# Patient Record
Sex: FEMALE | Race: WHITE | NOT HISPANIC OR LATINO
[De-identification: names, ages, dates, MRNs, and addresses within clinical notes are randomized per-mention and may not be internally consistent; named-entity substitution may affect disease eponyms.]

---
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
## 2019-06-18 ENCOUNTER — APPOINTMENT (OUTPATIENT)
Dept: PEDIATRICS | Facility: CLINIC | Age: 19
End: 2019-06-18
Payer: COMMERCIAL

## 2019-06-18 VITALS
WEIGHT: 248 LBS | HEIGHT: 64 IN | DIASTOLIC BLOOD PRESSURE: 70 MMHG | HEART RATE: 84 BPM | SYSTOLIC BLOOD PRESSURE: 126 MMHG | BODY MASS INDEX: 42.34 KG/M2 | OXYGEN SATURATION: 99 %

## 2019-06-18 DIAGNOSIS — Z87.09 PERSONAL HISTORY OF OTHER DISEASES OF THE RESPIRATORY SYSTEM: ICD-10-CM

## 2019-06-18 DIAGNOSIS — Z00.00 ENCOUNTER FOR GENERAL ADULT MEDICAL EXAMINATION W/OUT ABNORMAL FINDINGS: ICD-10-CM

## 2019-06-18 DIAGNOSIS — N92.6 IRREGULAR MENSTRUATION, UNSPECIFIED: ICD-10-CM

## 2019-06-18 PROCEDURE — 99395 PREV VISIT EST AGE 18-39: CPT

## 2019-06-18 PROCEDURE — 96160 PT-FOCUSED HLTH RISK ASSMT: CPT | Mod: 59

## 2019-06-18 PROCEDURE — 96127 BRIEF EMOTIONAL/BEHAV ASSMT: CPT

## 2019-06-18 PROCEDURE — 92551 PURE TONE HEARING TEST AIR: CPT

## 2019-06-18 NOTE — PHYSICAL EXAM
[Alert] : alert [No Acute Distress] : no acute distress [Normocephalic] : normocephalic [EOMI Bilateral] : EOMI bilateral [Clear tympanic membranes with bony landmarks and light reflex present bilaterally] : clear tympanic membranes with bony landmarks and light reflex present bilaterally  [Pink Nasal Mucosa] : pink nasal mucosa [Nonerythematous Oropharynx] : nonerythematous oropharynx [Supple, full passive range of motion] : supple, full passive range of motion [No Palpable Masses] : no palpable masses [Clear to Ausculatation Bilaterally] : clear to auscultation bilaterally [Regular Rate and Rhythm] : regular rate and rhythm [Normal S1, S2 audible] : normal S1, S2 audible [No Murmurs] : no murmurs [NonTender] : non tender [Soft] : soft [+2 Femoral Pulses] : +2 femoral pulses [Non Distended] : non distended [Normoactive Bowel Sounds] : normoactive bowel sounds [No Hepatomegaly] : no hepatomegaly [No Splenomegaly] : no splenomegaly [No Abnormal Lymph Nodes Palpated] : no abnormal lymph nodes palpated [Normal Muscle Tone] : normal muscle tone [No Gait Asymmetry] : no gait asymmetry [No pain or deformities with palpation of bone, muscles, joints] : no pain or deformities with palpation of bone, muscles, joints [Straight] : straight [+2 Patella DTR] : +2 patella DTR [Cranial Nerves Grossly Intact] : cranial nerves grossly intact [No Rash or Lesions] : no rash or lesions

## 2019-06-19 NOTE — COUNSELING
[FreeTextEntry1] : Counseling: Alcohol and drugs:. Bike helmet:. Breast self exam after periods:. Communication to patient: Counseling for nutrition provided Yes, Counseling for physical activity provided Yes, Counseled the Patient on tobacco use; cessation provided. Diet:. Exercise:. Injury prevention:. Seatbelts:. Smoking:. Sunscreen\par Health Promotion: Oral health brush teeth - small amount of flouride toothpaste, dental appointment. Sexual health sexual development, change in body structure , safe sex , sexually transmitted disease education , use of condoms. Smoke exposure. Personal hygiene. Skin/UV protection. \par Injury Prevention/Safety: Bike safety. Car restraints and seat belts. Drugs/alcohol/tobacco. \par Nutrition Counseling: Healthy food choices no forced foods , family meals as often as possible, limit or eliminate junk food, adequate calcium , adequate iron containing foods. Weight management. \par \par Social/Behavioral Counseling: Praise good behavior. Relationships caution about peer pressure , advised to cultivate safe relationships , how to say no . TV/game time limit and control TV and game time . Violence Prevention: Gun Safety. Gang Membership and Violence. \par \par Vaccine information sheet given- Date on VIS verified. \par

## 2019-06-19 NOTE — DISCUSSION/SUMMARY
[FreeTextEntry1] : 18 YEARS OLD GIRL HERE FOR CHECK UP.WANTS IT FOR COSMATOLOGY LICENSE\par DISCUSSED WITH HER  ABOUT WEIGHT,EXERCISE\par NEEDS TO SEE GYN FOR IRREGULAR PERIODS AS IT CAN BE DUE TO PCOS AND THAT CAN CAUSE INSULIN RESISTANCE

## 2019-06-19 NOTE — HISTORY OF PRESENT ILLNESS
[Mother] : mother [Up to date] : Up to date [Irregular menses] : irregular menses [Is permitted and is able to make independent decisions] : Is permitted and is able to make independent decisions [Has family members/adults to turn to for help] : has family members/adults to turn to for help [Drinks non-sweetened liquids] : drinks non-sweetened liquids  [Has friends] : has friends [Uses safety belts/safety equipment] : uses safety belts/safety equipment  [Has peer relationships free of violence] : has peer relationships free of violence [No] : Patient has not had sexual intercourse. [HIV Screening Declined] : HIV Screening Declined [Has ways to cope with stress] : has ways to cope with stress [Displays self-confidence] : displays self-confidence [With Teen] : teen [Sleep Concerns] : no sleep concerns [At least 1 hour of physical activity a day] : does not do at least 1 hour of physical activity a day [Uses electronic nicotine delivery system] : does not use electronic nicotine delivery system [Exposure to electronic nicotine delivery system] : no exposure to electronic nicotine delivery system [Uses tobacco] : does not use tobacco [Exposure to tobacco] : no exposure to tobacco [Uses drugs] : does not use drugs  [Exposure to drugs] : no exposure to drugs [Drinks alcohol] : does not drink alcohol [Exposure to alcohol] : no exposure to alcohol [Impaired/distracted driving] : no impaired/distracted driving [Has problems with sleep] : does not have problems with sleep [Gets depressed, anxious, or irritable/has mood swings] : does not get depressed, anxious, or irritable/has mood swings [Has thought about hurting self or considered suicide] : has not thought about hurting self or considered suicide [FreeTextEntry7] : p [de-identified] : periods are irregular [de-identified] : goes out to eat frequently [de-identified] : once in 2 years [de-identified] : finished high school,went to [facility] FOR COSMATOLOGY

## 2019-06-19 NOTE — CARE PLAN
[Care Plan reviewed every ___ weeks] : Care plan reviewed every [unfilled] weeks [Care Plan reviewed and provided to patient/caregiver] : Care plan reviewed and provided to patient/caregiver [FreeTextEntry2] : TO EAT BETTER,1600 CALORIE DIET\par TO DRINK MORE WATER\par TO EAT OUT LESS\par TO EXERCISE EVRY DAY 45 MINUTES [FreeTextEntry3] : TO GET BLLOD WORK DONE TO RULE OUT HYPERLIPIDEMIA,PRE DIABETES\par TO GET HER TO NUTRITIONIST SO SHE CAN LOOSE WEIGHT IN RIGHT WAY\par TO SEE GYN TO RULE OUT INSULIN RESISTANCE AND PCOS

## 2019-06-20 ENCOUNTER — RECORD ABSTRACTING (OUTPATIENT)
Age: 19
End: 2019-06-20

## 2020-02-18 ENCOUNTER — APPOINTMENT (OUTPATIENT)
Dept: PEDIATRICS | Facility: CLINIC | Age: 20
End: 2020-02-18
Payer: COMMERCIAL

## 2020-02-18 DIAGNOSIS — Z11.1 ENCOUNTER FOR SCREENING FOR RESPIRATORY TUBERCULOSIS: ICD-10-CM

## 2020-02-18 PROCEDURE — ZZZZZ: CPT
